# Patient Record
Sex: FEMALE | Race: WHITE | Employment: FULL TIME | URBAN - METROPOLITAN AREA
[De-identification: names, ages, dates, MRNs, and addresses within clinical notes are randomized per-mention and may not be internally consistent; named-entity substitution may affect disease eponyms.]

---

## 2018-01-08 LAB
ANTIBODY SCREEN, EXTERNAL: NEGATIVE
CHLAMYDIA, EXTERNAL: NEGATIVE
HBSAG, EXTERNAL: NEGATIVE
HCT, EXTERNAL: 39.8
HGB, EXTERNAL: 12.8
HIV, EXTERNAL: NEGATIVE
N. GONORRHEA, EXTERNAL: NEGATIVE
PLATELET CNT,   EXTERNAL: 293
RUBELLA, EXTERNAL: NORMAL
T. PALLIDUM, EXTERNAL: NORMAL
TYPE, ABO & RH, EXTERNAL: NORMAL

## 2018-05-15 LAB — GTT, 1 HR, GLUCOLA, EXTERNAL: 106

## 2018-07-24 LAB — GRBS, EXTERNAL: NEGATIVE

## 2018-08-14 ENCOUNTER — ANESTHESIA EVENT (OUTPATIENT)
Dept: LABOR AND DELIVERY | Age: 33
End: 2018-08-14
Payer: COMMERCIAL

## 2018-08-14 ENCOUNTER — ANESTHESIA (OUTPATIENT)
Dept: LABOR AND DELIVERY | Age: 33
End: 2018-08-14
Payer: COMMERCIAL

## 2018-08-14 ENCOUNTER — HOSPITAL ENCOUNTER (INPATIENT)
Age: 33
LOS: 2 days | Discharge: HOME | End: 2018-08-16
Attending: OBSTETRICS & GYNECOLOGY | Admitting: OBSTETRICS & GYNECOLOGY
Payer: COMMERCIAL

## 2018-08-14 DIAGNOSIS — R52 POSTPARTUM PAIN: Primary | ICD-10-CM

## 2018-08-14 PROBLEM — O34.219 PREVIOUS CESAREAN DELIVERY AFFECTING PREGNANCY: Status: ACTIVE | Noted: 2018-08-14

## 2018-08-14 LAB
ABO + RH BLD: NORMAL
BASOPHILS # BLD: 0.1 K/UL (ref 0–0.1)
BASOPHILS NFR BLD: 1 % (ref 0–1)
BLOOD GROUP ANTIBODIES SERPL: NORMAL
DIFFERENTIAL METHOD BLD: ABNORMAL
EOSINOPHIL # BLD: 0.1 K/UL (ref 0–0.4)
EOSINOPHIL NFR BLD: 1 % (ref 0–7)
ERYTHROCYTE [DISTWIDTH] IN BLOOD BY AUTOMATED COUNT: 13.2 % (ref 11.5–14.5)
HCT VFR BLD AUTO: 36.6 % (ref 35–47)
HGB BLD-MCNC: 11.9 G/DL (ref 11.5–16)
IMM GRANULOCYTES # BLD: 0.2 K/UL (ref 0–0.04)
IMM GRANULOCYTES NFR BLD AUTO: 2 % (ref 0–0.5)
LYMPHOCYTES # BLD: 1.7 K/UL (ref 0.8–3.5)
LYMPHOCYTES NFR BLD: 19 % (ref 12–49)
MCH RBC QN AUTO: 30.3 PG (ref 26–34)
MCHC RBC AUTO-ENTMCNC: 32.5 G/DL (ref 30–36.5)
MCV RBC AUTO: 93.1 FL (ref 80–99)
MONOCYTES # BLD: 0.8 K/UL (ref 0–1)
MONOCYTES NFR BLD: 9 % (ref 5–13)
NEUTS SEG # BLD: 6.1 K/UL (ref 1.8–8)
NEUTS SEG NFR BLD: 69 % (ref 32–75)
NRBC # BLD: 0 K/UL (ref 0–0.01)
NRBC BLD-RTO: 0 PER 100 WBC
PLATELET # BLD AUTO: 200 K/UL (ref 150–400)
PMV BLD AUTO: 10.1 FL (ref 8.9–12.9)
RBC # BLD AUTO: 3.93 M/UL (ref 3.8–5.2)
SPECIMEN EXP DATE BLD: NORMAL
WBC # BLD AUTO: 8.8 K/UL (ref 3.6–11)

## 2018-08-14 PROCEDURE — 76060000078 HC EPIDURAL ANESTHESIA: Performed by: OBSTETRICS & GYNECOLOGY

## 2018-08-14 PROCEDURE — 74011250636 HC RX REV CODE- 250/636: Performed by: OBSTETRICS & GYNECOLOGY

## 2018-08-14 PROCEDURE — 36415 COLL VENOUS BLD VENIPUNCTURE: CPT | Performed by: OBSTETRICS & GYNECOLOGY

## 2018-08-14 PROCEDURE — 77030018836 HC SOL IRR NACL ICUM -A

## 2018-08-14 PROCEDURE — 77030007866 HC KT SPN ANES BBMI -B: Performed by: NURSE ANESTHETIST, CERTIFIED REGISTERED

## 2018-08-14 PROCEDURE — C1765 ADHESION BARRIER: HCPCS

## 2018-08-14 PROCEDURE — 77030032490 HC SLV COMPR SCD KNE COVD -B

## 2018-08-14 PROCEDURE — 76010000391 HC C SECN FIRST 1 HR: Performed by: OBSTETRICS & GYNECOLOGY

## 2018-08-14 PROCEDURE — 76010000392 HC C SECN EA ADDL 0.5 HR: Performed by: OBSTETRICS & GYNECOLOGY

## 2018-08-14 PROCEDURE — 74011000250 HC RX REV CODE- 250

## 2018-08-14 PROCEDURE — 75410000003 HC RECOV DEL/VAG/CSECN EA 0.5 HR: Performed by: OBSTETRICS & GYNECOLOGY

## 2018-08-14 PROCEDURE — 77030039266 HC ADH SKN EXOFIN S2SG -A

## 2018-08-14 PROCEDURE — 85025 COMPLETE CBC W/AUTO DIFF WBC: CPT | Performed by: OBSTETRICS & GYNECOLOGY

## 2018-08-14 PROCEDURE — 74011250636 HC RX REV CODE- 250/636

## 2018-08-14 PROCEDURE — 65270000029 HC RM PRIVATE

## 2018-08-14 PROCEDURE — 74011000258 HC RX REV CODE- 258: Performed by: OBSTETRICS & GYNECOLOGY

## 2018-08-14 PROCEDURE — 77030034850

## 2018-08-14 PROCEDURE — 86900 BLOOD TYPING SEROLOGIC ABO: CPT | Performed by: OBSTETRICS & GYNECOLOGY

## 2018-08-14 RX ORDER — ONDANSETRON 2 MG/ML
INJECTION INTRAMUSCULAR; INTRAVENOUS AS NEEDED
Status: DISCONTINUED | OUTPATIENT
Start: 2018-08-14 | End: 2018-08-14 | Stop reason: HOSPADM

## 2018-08-14 RX ORDER — GENTAMICIN SULFATE 80 MG/100ML
INJECTION, SOLUTION INTRAVENOUS AS NEEDED
Status: DISCONTINUED | OUTPATIENT
Start: 2018-08-14 | End: 2018-08-14 | Stop reason: HOSPADM

## 2018-08-14 RX ORDER — FENTANYL CITRATE 50 UG/ML
INJECTION, SOLUTION INTRAMUSCULAR; INTRAVENOUS AS NEEDED
Status: DISCONTINUED | OUTPATIENT
Start: 2018-08-14 | End: 2018-08-14 | Stop reason: HOSPADM

## 2018-08-14 RX ORDER — SODIUM CHLORIDE 0.9 % (FLUSH) 0.9 %
5-10 SYRINGE (ML) INJECTION EVERY 8 HOURS
Status: DISCONTINUED | OUTPATIENT
Start: 2018-08-14 | End: 2018-08-16

## 2018-08-14 RX ORDER — MORPHINE SULFATE 0.5 MG/ML
INJECTION, SOLUTION EPIDURAL; INTRATHECAL; INTRAVENOUS AS NEEDED
Status: DISCONTINUED | OUTPATIENT
Start: 2018-08-14 | End: 2018-08-14 | Stop reason: HOSPADM

## 2018-08-14 RX ORDER — HYDROCODONE BITARTRATE AND ACETAMINOPHEN 5; 325 MG/1; MG/1
1 TABLET ORAL
Status: DISCONTINUED | OUTPATIENT
Start: 2018-08-14 | End: 2018-08-16 | Stop reason: HOSPADM

## 2018-08-14 RX ORDER — NALOXONE HYDROCHLORIDE 0.4 MG/ML
0.4 INJECTION, SOLUTION INTRAMUSCULAR; INTRAVENOUS; SUBCUTANEOUS AS NEEDED
Status: DISCONTINUED | OUTPATIENT
Start: 2018-08-14 | End: 2018-08-16 | Stop reason: HOSPADM

## 2018-08-14 RX ORDER — SIMETHICONE 80 MG
80 TABLET,CHEWABLE ORAL
Status: DISCONTINUED | OUTPATIENT
Start: 2018-08-14 | End: 2018-08-16 | Stop reason: HOSPADM

## 2018-08-14 RX ORDER — CLINDAMYCIN PHOSPHATE 900 MG/50ML
900 INJECTION, SOLUTION INTRAVENOUS ONCE
Status: COMPLETED | OUTPATIENT
Start: 2018-08-14 | End: 2018-08-14

## 2018-08-14 RX ORDER — OXYTOCIN 10 [USP'U]/ML
INJECTION, SOLUTION INTRAMUSCULAR; INTRAVENOUS AS NEEDED
Status: DISCONTINUED | OUTPATIENT
Start: 2018-08-14 | End: 2018-08-14 | Stop reason: HOSPADM

## 2018-08-14 RX ORDER — EPHEDRINE SULFATE 50 MG/ML
INJECTION, SOLUTION INTRAVENOUS AS NEEDED
Status: DISCONTINUED | OUTPATIENT
Start: 2018-08-14 | End: 2018-08-14 | Stop reason: HOSPADM

## 2018-08-14 RX ORDER — ZOLPIDEM TARTRATE 5 MG/1
5 TABLET ORAL
Status: DISCONTINUED | OUTPATIENT
Start: 2018-08-14 | End: 2018-08-16 | Stop reason: HOSPADM

## 2018-08-14 RX ORDER — SODIUM CHLORIDE 0.9 % (FLUSH) 0.9 %
5-10 SYRINGE (ML) INJECTION EVERY 8 HOURS
Status: DISCONTINUED | OUTPATIENT
Start: 2018-08-14 | End: 2018-08-14 | Stop reason: HOSPADM

## 2018-08-14 RX ORDER — BISACODYL 5 MG
5 TABLET, DELAYED RELEASE (ENTERIC COATED) ORAL DAILY PRN
Status: DISCONTINUED | OUTPATIENT
Start: 2018-08-14 | End: 2018-08-16 | Stop reason: HOSPADM

## 2018-08-14 RX ORDER — BUPIVACAINE HYDROCHLORIDE 7.5 MG/ML
INJECTION, SOLUTION EPIDURAL; RETROBULBAR AS NEEDED
Status: DISCONTINUED | OUTPATIENT
Start: 2018-08-14 | End: 2018-08-14 | Stop reason: HOSPADM

## 2018-08-14 RX ORDER — KETOROLAC TROMETHAMINE 30 MG/ML
30 INJECTION, SOLUTION INTRAMUSCULAR; INTRAVENOUS
Status: DISPENSED | OUTPATIENT
Start: 2018-08-14 | End: 2018-08-15

## 2018-08-14 RX ORDER — SODIUM CHLORIDE, SODIUM LACTATE, POTASSIUM CHLORIDE, CALCIUM CHLORIDE 600; 310; 30; 20 MG/100ML; MG/100ML; MG/100ML; MG/100ML
125 INJECTION, SOLUTION INTRAVENOUS CONTINUOUS
Status: DISCONTINUED | OUTPATIENT
Start: 2018-08-14 | End: 2018-08-16 | Stop reason: HOSPADM

## 2018-08-14 RX ORDER — SODIUM CHLORIDE 0.9 % (FLUSH) 0.9 %
5-10 SYRINGE (ML) INJECTION AS NEEDED
Status: DISCONTINUED | OUTPATIENT
Start: 2018-08-14 | End: 2018-08-16 | Stop reason: HOSPADM

## 2018-08-14 RX ORDER — SODIUM CHLORIDE 0.9 % (FLUSH) 0.9 %
5-10 SYRINGE (ML) INJECTION AS NEEDED
Status: DISCONTINUED | OUTPATIENT
Start: 2018-08-14 | End: 2018-08-14 | Stop reason: HOSPADM

## 2018-08-14 RX ORDER — OXYTOCIN/RINGER'S LACTATE 20/1000 ML
125-500 PLASTIC BAG, INJECTION (ML) INTRAVENOUS ONCE
Status: ACTIVE | OUTPATIENT
Start: 2018-08-14 | End: 2018-08-15

## 2018-08-14 RX ORDER — ONDANSETRON 2 MG/ML
4 INJECTION INTRAMUSCULAR; INTRAVENOUS
Status: DISCONTINUED | OUTPATIENT
Start: 2018-08-14 | End: 2018-08-16 | Stop reason: HOSPADM

## 2018-08-14 RX ORDER — SODIUM CHLORIDE, SODIUM LACTATE, POTASSIUM CHLORIDE, CALCIUM CHLORIDE 600; 310; 30; 20 MG/100ML; MG/100ML; MG/100ML; MG/100ML
1000 INJECTION, SOLUTION INTRAVENOUS CONTINUOUS
Status: DISCONTINUED | OUTPATIENT
Start: 2018-08-14 | End: 2018-08-14 | Stop reason: HOSPADM

## 2018-08-14 RX ADMIN — SODIUM CHLORIDE, SODIUM LACTATE, POTASSIUM CHLORIDE, AND CALCIUM CHLORIDE 1000 ML: 600; 310; 30; 20 INJECTION, SOLUTION INTRAVENOUS at 11:15

## 2018-08-14 RX ADMIN — BUPIVACAINE HYDROCHLORIDE 1.5 ML: 7.5 INJECTION, SOLUTION EPIDURAL; RETROBULBAR at 12:40

## 2018-08-14 RX ADMIN — OXYTOCIN 40 UNITS: 10 INJECTION, SOLUTION INTRAMUSCULAR; INTRAVENOUS at 13:07

## 2018-08-14 RX ADMIN — ONDANSETRON 4 MG: 2 INJECTION, SOLUTION INTRAMUSCULAR; INTRAVENOUS at 17:49

## 2018-08-14 RX ADMIN — EPHEDRINE SULFATE 5 MG: 50 INJECTION, SOLUTION INTRAVENOUS at 12:42

## 2018-08-14 RX ADMIN — KETOROLAC TROMETHAMINE 30 MG: 30 INJECTION, SOLUTION INTRAMUSCULAR at 15:32

## 2018-08-14 RX ADMIN — CLINDAMYCIN PHOSPHATE 900 MG: 900 INJECTION, SOLUTION INTRAVENOUS at 11:49

## 2018-08-14 RX ADMIN — GENTAMICIN SULFATE 300 MG: 40 INJECTION, SOLUTION INTRAMUSCULAR; INTRAVENOUS at 12:21

## 2018-08-14 RX ADMIN — ONDANSETRON 4 MG: 2 INJECTION INTRAMUSCULAR; INTRAVENOUS at 13:11

## 2018-08-14 RX ADMIN — SODIUM CHLORIDE, SODIUM LACTATE, POTASSIUM CHLORIDE, AND CALCIUM CHLORIDE 125 ML/HR: 600; 310; 30; 20 INJECTION, SOLUTION INTRAVENOUS at 15:20

## 2018-08-14 RX ADMIN — FENTANYL CITRATE 10 MCG: 50 INJECTION, SOLUTION INTRAMUSCULAR; INTRAVENOUS at 12:40

## 2018-08-14 RX ADMIN — GENTAMICIN SULFATE 300 MG: 80 INJECTION, SOLUTION INTRAVENOUS at 12:21

## 2018-08-14 RX ADMIN — EPHEDRINE SULFATE 5 MG: 50 INJECTION, SOLUTION INTRAVENOUS at 12:44

## 2018-08-14 RX ADMIN — MORPHINE SULFATE 200 MCG: 0.5 INJECTION, SOLUTION EPIDURAL; INTRATHECAL; INTRAVENOUS at 12:40

## 2018-08-14 RX ADMIN — KETOROLAC TROMETHAMINE 30 MG: 30 INJECTION, SOLUTION INTRAMUSCULAR at 21:33

## 2018-08-14 RX ADMIN — SODIUM CHLORIDE, SODIUM LACTATE, POTASSIUM CHLORIDE, AND CALCIUM CHLORIDE: 600; 310; 30; 20 INJECTION, SOLUTION INTRAVENOUS at 13:05

## 2018-08-14 RX ADMIN — SODIUM CHLORIDE, SODIUM LACTATE, POTASSIUM CHLORIDE, AND CALCIUM CHLORIDE 1000 ML: 600; 310; 30; 20 INJECTION, SOLUTION INTRAVENOUS at 12:22

## 2018-08-14 NOTE — H&P
History & Physical    Name: Meera Costa MRN: [id number]  SSN: [id number]    YOB: 1985  Age: 26 y.o. Sex: female      Subjective:     Estimated Date of Delivery: None noted. OB History      Para Term  AB Living    1         SAB TAB Ectopic Molar Multiple Live Births                   Ms. Brenda Rodriguez admitted with pregnancy at Unknown for  section due to previous  section. Prenatal course was normal. Please see prenatal records for details. No past medical history on file. No past surgical history on file. Social History     Occupational History    Not on file. Social History Main Topics    Smoking status: Not on file    Smokeless tobacco: Not on file    Alcohol use Not on file    Drug use: Not on file    Sexual activity: Not on file     No family history on file. Allergies not on file  Prior to Admission medications    Not on File        Review of Systems: A comprehensive review of systems was negative except for that written in the History of Present Illness. Objective:     Vitals: There were no vitals filed for this visit. Physical Exam:  Patient without distress. Abdomen: soft, nontender  Fundus: soft and non tender  Perineum: blood absent, amniotic fluid absent  Lower Extremities:  - Edema 1+  Estimated Fetal Weight: 8lbs 2oz   Membranes:  Intact  Fetal Heart Rate: waiting to go on the monitor    Prenatal Labs:   No results found for: RUBELLAEXT, GRBSEXT, HBSAGEXT, HIVEXT, RPREXT, GONNOEXT, CHLAMEXT     Impression/Plan:     Plan:  Admit for  section. Discussed the risks of surgery including the risks of bleeding, infection, deep vein thrombosis, and surgical injuries to internal organs including but not limited to the bowels, bladder, rectum, and female reproductive organs. The patient understands the risks; any and all questions were answered to the patient's satisfaction.     Signed By:  Blossom Collazo MD      2018

## 2018-08-14 NOTE — ANESTHESIA PROCEDURE NOTES
Spinal Block    Start time: 8/14/2018 12:36 PM  End time: 8/14/2018 12:40 PM  Performed by: Luz Elena Ugalde  Authorized by: Linda Echevarria     Pre-procedure:   Indications: at surgeon's request and primary anesthetic  Preanesthetic Checklist: patient identified, risks and benefits discussed, anesthesia consent and timeout performed      Spinal Block:   Patient Position:  Seated  Prep Region:  Lumbar  Prep: chlorhexidine      Location:  L3-4  Technique:  Single shot        Needle:   Needle Type:  Pencan  Needle Gauge:  25 G  Attempts:  1      Events: CSF confirmed, no blood with aspiration and no paresthesia        Assessment:  Insertion:  Uncomplicated  Patient tolerance:  Patient tolerated the procedure well with no immediate complications  Spinal easily placed x1 attempt  +CSF  Neg heme, neg paresthesia  T6 level assessed

## 2018-08-14 NOTE — DISCHARGE SUMMARY
Patient ID:  Delia Velazquez  [id number]  26 y.o.  1985    Admit Date: 2018    Discharge Date: 18     Admitting Physician: Cecy Sims MD    Attending Physician: Cecy Sims MD    Admission Diagnoses: Repeat  Previous  delivery affecting pregnancy    Procedures for this admission: Procedure(s):   SECTION    Discharge Diagnoses: Same as above with LFCS producing a viable infant. Information for the patient's :  Mode Melara [[id number]]   One Minute Apgar: 5 (Filed from Delivery Summary)  Five  Minute Apgar: 5 (Filed from Delivery Summary)        Discharge Disposition:  home    Discharge Condition:  stable    Additional Diagnoses: previous c section. Maternal Labs:   Lab Results   Component Value Date/Time    HBsAg, External negative 2018    HIV, External negative 2018    Rubella, External immune 2018    GrBStrep, External negative 2018       Cord Blood Results:   Information for the patient's :  Tai Fitzgerald Male [[id number]]   No results found for: PCTABR, ABORH, PCTDIG, BILI, ABORH, ABORHEXT          History of Present Illness:   OB History      Para Term  AB Living    3 1 1  1 1    SAB TAB Ectopic Molar Multiple Live Births    1    0 1        Admitted for  Section. Hospital Course:   Patient was admitted as above and delivered via LFCS . Please the chart for details. The postpartum course was unremarkable. She was deemed stable for discharge home on day 2.      Follow-up Care: 2 w        Signed:  Cecy Sims MD  2018  1:50 PM

## 2018-08-14 NOTE — PROGRESS NOTES
4:41 PM  TRANSFER - OUT REPORT:    Verbal report given to Villa Verma RN(name) on Ronald Vegas  being transferred to MIU(unit) for routine progression of care   By Cynthia Mccarthy RN via phone. Report consisted of patients Situation, Background, Assessment and   Recommendations(SBAR). Information from the following report(s) SBAR, Kardex, OR Summary, Procedure Summary, Intake/Output, MAR, Recent Results and Med Rec Status was reviewed with the receiving nurse. Lines:   Peripheral IV 18 Left Hand (Active)   Site Assessment Clean, dry, & intact 2018 11:25 AM   Phlebitis Assessment 0 2018 11:25 AM   Infiltration Assessment 0 2018 11:25 AM   Dressing Status Clean, dry, & intact 2018 11:25 AM   Dressing Type Tape;Transparent 2018 11:25 AM   Hub Color/Line Status Pink; Infusing;Patent 2018 11:25 AM   Action Taken Blood drawn 2018 11:25 AM   Alcohol Cap Used Yes 2018 11:25 AM        Opportunity for questions and clarification was provided.  ID bands confirmed with [employer], RN    Patient transported by PETR Aleman RN via stretcher and with baby via open crib.     Registered Nurse  Tech

## 2018-08-14 NOTE — ROUTINE PROCESS
Bedside and Verbal shift change report given to DARY Antony (oncoming nurse) by Kristofer Rangel RN (offgoing nurse). Report given with SBAR, Kardex, Intake/Output and MAR.

## 2018-08-14 NOTE — L&D DELIVERY NOTE
Delivery Summary    Patient: Koby Woods MRN: [id number]  SSN: [id number]    YOB: 1985  Age: 26 y.o. Sex: female        Labor Events:    Labor: No    Rupture Date: 2018    Rupture Time: 1:05 PM    Rupture Type: AROM    Amniotic Fluid Volume:      Amniotic Fluid Description:  Amniotic fluid Odor: Clear    None     Induction: None        Induction Date:        Induction Time:       Indications for Induction:       Augmentation: None    Augmentation Date:      Augmentation Time:      Indications for Augmentation:      Events:       Cervical Ripening:       None    Rupture Identifier: Rupture 1     Labor complications: None     Additional complications:         Delivery Events:  Estimated Blood Loss (ml): 800      Information for the patient's newbornNalini Martin Male [[id number]]     Delivery Summary - Baby    Delivery Date: 2018  Delivery Time: 1:05 PM  Delivery Type: , Low Transverse    Section Delivery:     Sex:  male     Gestational Age: 39w0d  Delivery Clinician:  Vincenzo Turner   Living?: Living  Delivery Location: OR           APGARS  One minute Five minutes Ten minutes   Skin color: 1   1        Heart rate: 2   2        Grimace: 2   2        Muscle tone: 2   2        Breathin   2        Totals: 9   9           Presentation: Vertex    Position:        Resuscitation Method:  Suctioning-bulb; Tactile Stimulation     Meconium Stained: None      Cord Information: 3 Vessels   Complications: None  Cord Blood Sent?:  Yes    Blood Gases Sent?:  No    Placenta:  Date/Time:   1:06 PM  Removal: Manual Removal      Appearance: Normal     [place] Measurements:  Birth Weight:        Birth Length:        Head Circumference:        Chest Circumference:       Abdominal Girth:       Other Providers:   CAS BROTHERS;DALTON GALLARDO;DON MOYA;GARDENIA MAGUIRE;SAVANNAH TRUJILLO;Tamela RICHARDSON CATHRYN L Obstetrician;Primary Nurse;Primary Magazine Nurse;Staff Nurse;Scrub Tech;Surgeon Assistant;Crna           Group Beta Strep:   Lab Results   Component Value Date/Time    GrANA MARIAtredarci, External negative 2018        Cord Blood Results:  Information for the patient's :  Eula Stephens, Male [[id number]]   No results found for: Juris Honer, PCTDIG, BILI, ABORHEXT, ABORH    Information for the patient's :  Eula Stephens, Male [[id number]]   No results found for: APH, APCO2, APO2, AHCO3, ABEC, ABDC, O2ST, SITE, [place], PHI, [place], PO2I, HCO3I, SO2I, IBD    Information for the patient's :  Eula Stephens, Male [[id number]]   No results found for: EPHV, PCO2V, PO2V, HCO3V, O2STV, EBDV

## 2018-08-14 NOTE — ROUTINE PROCESS
5:27 PM  Patient in room vomiting. Requesting anti-emetic. Called down to L&D to speak with provider Spoke with Dr. Jamal Segundo. Okay to order patient Zofran 4 mg IV every 4 hours as needed for nausea, as well as Phenergan 12.5 IV every 6 hours as needed. Orders placed. No further action needed at this time.

## 2018-08-14 NOTE — PROGRESS NOTES
TRANSFER - OUT REPORT:    Verbal report given to Cheyjo Slaughter (name) on Ronald Vegas  being transferred to MIU(unit) for routine post - op       Report consisted of patients Situation, Background, Assessment and   Recommendations(SBAR). Information from the following report(s) SBAR, Procedure Summary, Intake/Output, MAR, Recent Results and Med Rec Status was reviewed with the receiving nurse. Lines:   Peripheral IV 08/14/18 Left Hand (Active)   Site Assessment Clean, dry, & intact 8/14/2018 11:25 AM   Phlebitis Assessment 0 8/14/2018 11:25 AM   Infiltration Assessment 0 8/14/2018 11:25 AM   Dressing Status Clean, dry, & intact 8/14/2018 11:25 AM   Dressing Type Tape;Transparent 8/14/2018 11:25 AM   Hub Color/Line Status Pink; Infusing;Patent 8/14/2018 11:25 AM   Action Taken Blood drawn 8/14/2018 11:25 AM   Alcohol Cap Used Yes 8/14/2018 11:25 AM        Opportunity for questions and clarification was provided. Patient transported with:   Registered Nurse, infant in [facility], & patient belongings.

## 2018-08-14 NOTE — ANESTHESIA POSTPROCEDURE EVALUATION
Post-Anesthesia Evaluation and Assessment    Patient: Malvin Leiva MRN: [id number]  SSN: [id number]    YOB: 1985  Age: 26 y.o. Sex: female       Cardiovascular Function/Vital Signs  Visit Vitals    BP 99/56    Pulse 66    Temp 36.4 °C (97.6 °F)    Resp 16    Ht 5' 6\" (1.676 m)    Wt 87.1 kg (192 lb)    SpO2 99%    Breastfeeding Unknown    BMI 30.99 kg/m2       Patient is status post spinal anesthesia for Procedure(s):   SECTION. Nausea/Vomiting: None    Postoperative hydration reviewed and adequate. Pain:  Pain Scale 1: Numeric (0 - 10) (18 1358)  Pain Intensity 1: 0 (18 1358)   Managed    Neurological Status: At baseline    Mental Status and Level of Consciousness: Arousable    Pulmonary Status:   O2 Device: Room air (18 1358)   Adequate oxygenation and airway patent    Complications related to anesthesia: None    Post-anesthesia assessment completed.  No concerns    Signed By: Dayanna Montes MD     2018

## 2018-08-14 NOTE — IP AVS SNAPSHOT
[address] [facility], [place] 
 
 
 [address] [phone] Patient: Pau Allen MRN: [id number] :1985 About your hospitalization You were admitted on:  2018 [facility] the:  [facility] [address] You were discharged on:  2018 Why you were hospitalized Your primary diagnosis was:  Not on File Your diagnoses also included:  Previous  Delivery Affecting Pregnancy Follow-up Information Follow up With Details Comments Contact Info Macey Olson MD   [address] [phone] Discharge Orders None A check paris indicates which time of day the medication should be taken. My Medications START taking these medications Instructions Each Dose to Equal  
 Morning Noon Evening Bedtime HYDROcodone-acetaminophen 5-325 mg per tablet Commonly known as:  Aubree Melvin Your last dose was: Your next dose is: Take 1 Tab by mouth every six (6) hours as needed. Max Daily Amount: 4 Tabs. 1 Tab  
    
   
   
   
  
 ibuprofen 800 mg tablet Commonly known as:  MOTRIN Your last dose was: Your next dose is: Take 1 Tab by mouth every eight (8) hours as needed. 800 mg CONTINUE taking these medications Instructions Each Dose to Equal  
 Morning Noon Evening Bedtime PNV No12-Iron-FA-DSS-OM-3 29 mg iron-1 mg -50 mg Cpkd Your last dose was: Your next dose is: Take  by mouth. Where to Get Your Medications Information on where to get these meds will be given to you by the nurse or doctor. ! Ask your nurse or doctor about these medications HYDROcodone-acetaminophen 5-325 mg per tablet  
 ibuprofen 800 mg tablet Opioid Education Prescription Opioids: What You Need to Know: 
 
Prescription opioids can be used to help relieve moderate-to-severe pain and are often prescribed following a surgery or injury, or for certain health conditions. These medications can be an important part of treatment but also come with serious risks. Opioids are strong pain medicines. Examples include hydrocodone, oxycodone, fentanyl, and morphine. Heroin is an example of an illegal opioid. It is important to work with your health care provider to make sure you are getting the safest, most effective care. WHAT ARE THE RISKS AND SIDE EFFECTS OF OPIOID USE? Prescription opioids carry serious risks of addiction and overdose, especially with prolonged use. An opioid overdose, often marked by slow breathing, can cause sudden death. The use of prescription opioids can have a number of side effects as well, even when taken as directed. · Tolerance-meaning you might need to take more of a medication for the same pain relief · Physical dependence-meaning you have symptoms of withdrawal when the medication is stopped. Withdrawal symptoms can include nausea, sweating, chills, diarrhea, stomach cramps, and muscle aches. Withdrawal can last up to several weeks, depending on which drug you took and how long you took it. · Increased sensitivity to pain · Constipation · Nausea, vomiting, and dry mouth · Sleepiness and dizziness · Confusion · Depression · Low levels of testosterone that can result in lower sex drive, energy, and strength · Itching and sweating RISKS ARE GREATER WITH:      
· History of drug misuse, substance use disorder, or overdose · Mental health conditions (such as depression or anxiety) · Sleep apnea · Older age (72 years or older) · Pregnancy Avoid alcohol while taking prescription opioids. Also, unless specifically advised by your health care provider, medications to avoid include: · Benzodiazepines (such as Xanax or Valium) · Muscle relaxants (such as Soma or Flexeril) · Hypnotics (such as Ambien or Lunesta) · Other prescription opioids KNOW YOUR OPTIONS Talk to your health care provider about ways to manage your pain that don't involve prescription opioids. Some of these options may actually work better and have fewer risks and side effects. Options may include: 
· Pain relievers such as acetaminophen, ibuprofen, and naproxen · Some medications that are also used for depression or seizures · Physical therapy and exercise · Counseling to help patients learn how to cope better with triggers of pain and stress. · Application of heat or cold compress · Massage therapy · Relaxation techniques Be Informed Make sure you know the name of your medication, how much and how often to take it, and its potential risks & side effects. IF YOU ARE PRESCRIBED OPIOIDS FOR PAIN: 
· Never take opioids in greater amounts or more often than prescribed. Remember the goal is not to be pain-free but to manage your pain at a tolerable level. · Follow up with your primary care provider to: · Work together to create a plan on how to manage your pain. · Talk about ways to help manage your pain that don't involve prescription opioids. · Talk about any and all concerns and side effects. · Help prevent misuse and abuse. · Never sell or share prescription opioids · Help prevent misuse and abuse. · Store prescription opioids in a secure place and out of reach of others (this may include visitors, children, friends, and family). · Safely dispose of unused/unwanted prescription opioids: Find your community drug take-back program or your pharmacy mail-back program, or flush them down the toilet, following guidance from the Food and Drug Administration (www.fda.gov/Drugs/ResourcesForYou). · Visit www.cdc.gov/drugoverdose to learn about the risks of opioid abuse and overdose. · If you believe you may be struggling with addiction, tell your health care provider and ask for guidance or call Jackie Hou at 8-411-768-Thucy. Discharge Instructions Discharge Instructions for  Section Patient ID: Carol Liz [id number] 
26 y.o. 
1985 Take Home Medications Continue taking your prenatal vitamins if you are breastfeeding. Follow-up Appointment: 
Follow-up with [facility] in 2 weeks. Follow-up care is a key part of your treatment and safety. Be sure to make and go to all appointments, and call your doctor if you are having problems. Its also a good idea to know your test results and keep a list of the medicines you take. Activity Avoid lifting more than 10 lbs for 6 weeks after your delivery. Don't put anything in your vagina for 6 weeks (no intercourse, tampons, or douching). Limit climbing stairs to twice a day, and please hold a railing. Have someone else carry your baby up stairs initially, as you may be a bit unsteady. No driving for about 2 weeks or until your are easily able to turn your body and move your foot from the gas to the brake pedal without pain- you need to be able to move quickly enough to respond to traffic. You cannot drive while you are taking narcotics (prescription pain medications). You may shower but do not take a bath for 2 weeks. Be sure to dry your incision well after your shower. You may gradually work up to light exercise such as brisk walking over the next few weeks, but take it easy- you'll be tired and sore! You need to wait 4-6 weeks before starting vigorous exercise such as aerobics, running, weight-lifting, sit-ups, etc.- clear this with your doctor at your postpartum check-up. Although it's important to limit certain activities and avoid \"over-doing it\", walking is good for you and will actually speed the healing process. Walking increases the blood flow to your legs, decreasing the risk of blood clots, and also encourages the bowels to speed up, decreasing constipation, bloating, and gassiness. Don't stay in bed at home, get up and move around the house. You'll feel better more quickly. Diet You may eat a regular diet but you may want to avoid heavy, greasy or spicy foods and other foods that could increase constipation and gas. Wound care Your incision may have been closed with conventional (metal) staples, absorbable (dissolving) staples, or absorbable sutures. If you still have staples in your incision when you leave the hospital, call your doctor's office as instructed to schedule an appointment to have them removed (usually in about a week). Otherwise, there may be some small tapes over your incision called Steri-strips. Please remove these in about a week. There also may be Dermabond glue over your incision which looks like a clear, shiny coating (as if it were painted with clear fingernail polish). This will gradually peel off over the following weeks, do not remove it. It is normal to have a small amount of clear or reddish drainage from your incision. It's best to leave it open to the air, but if there is drainage, you may cover the incision lightly with gauze, preferably without tape. Keep the incision as dry as possible. You may even consider drying the incision with a cool hair dryer after you shower. Numbness of the skin at or around your incision is normal and the feeling usually returns gradually. Call your doctor if you have a lot of drainage from your incision, an unpleasant odor, red streaks, an increase in pain, or the incision appears to open up. Pain Management You were probably given a prescription for pain medication, similar to the one you've been taking while in the hospital.  In addition to this, you can take over-the-counter pain medicines like Advil or Motrin (ibuprofen). You can take both medications together, alternating doses every few hours. You will get the most relief if you take the maximum dose:  Motrin or Advil (generic ibuprofen) 800 mg every 8 hours (4 tablets or capsules every 8 hours). The prescription you were given probably contains a narcotic mixed with Tylenol, therefore, you should not take any extra Tylenol or acetominophen, or you could be getting more than the safe amount. If you feel you don't need the prescription pain medication, you may take Tylenol instead, along with ibuprofen. The maximum dose is Tylenol or acetominophen 1000 mg every 6 hours (equivalent to 2 Extra Strength Tylenols every 6 hours). After a few days, you should reduce the amount of prescription pain medication you are taking. At that point, try to use ibuprofen as the main medication, and take the prescription medication if needed for more severe pain not relieved by the ibuprofen. Your goal should be to take only the minimum necessary amounts of the prescription medication (narcotic), as these pain medicines can be addictive and will worsen or cause constipation. Most patients will find that within a few days, their pain is adequately controlled using only over-the-counter medications and minimal doses of prescription pain medicine. A heating pad can also be very helpful for cramping or back pain. Sitz baths are helpful for pain associated with hemorrhoids. You can use either a sitz bath basin or a bathtub filled with 2-3\" inches of plain warm water. Soak for 10 minutes 3 times a day. Over-the-counter hemorrhoid wipes and ointments are fine to use as well. Constipation You may find that bowel movements are irregular after delivery and that you have a tendency to be constipated, especially after surgery. A stool softener such as Colace (docusate) can safely be taken daily if needed.   If you become constipated you can use a laxative such as Dulcolax, Miralax or Milk of Magnesia. Don't wait until constipation is severe- take something sooner rather than later and you will feel much better! Suppositories such as Dulcolax or Fleet's Enemas are options too. Your Recovery: What to Expect at [facility] Delivering a baby is hard work and you probably aren't getting much sleep, so you will certainly be tired, especially after having a  section. Try to rest when you can and don't worry about doing housework or other tasks which can wait. The soreness in your incision will improve significantly over the first 2 weeks or so, but it may take 6-8 weeks before you are completely recovered. You are likely to have some back pain or general body aches or muscle soreness. This should improve with acetominophen or ibuprofen. If your legs were swollen during your pregnancy or as a result of IV fluids given during your hospital stay, this should go away in a few days to a week. Most women experience some form of the \"Baby Blues\" after having a baby. This means that you may feel emotional, tearful, frustrated, anxious, sad, and irritable some of the time. This is normal if it's not severe and should go away after about 2 weeks. Getting as much rest as you can will help. Accept assistance from friends and family members so that you can take breaks from caring for the baby. Call your doctor if your symptoms seem severe, last more than 2 weeks, or seem to be getting worse instead of better. Get help immediately if you have thoughts of wanting to hurt yourself or others! When should you call for help? Call 911 anytime you think you may need emergency care. For example, call if: You pass out (lose consciousness). You have sudden chest pain and shortness of breath, or you cough up blood. You have severe pain in your belly.  
 
Call your doctor now or seek immediate medical care if: 
You have heavy vaginal bleeding that soaks one or more pads in an hour, or you have large clots (golf ball size or larger). Your have foul-smelling discharge from your vagina or incision. You are sick to your stomach or cannot keep fluids down. You have pain that does not get better after you take pain medicine. You have signs of infection, such as: Increased pain in your abdomen or vaginal area. Red streaks, warmth, or tenderness of your breasts or the area around your incision. A fever of 101 or greater (taken by mouth). You have signs of a blood clot, such as: 
Pain in your calf, back of knee, thigh, or groin. Redness and swelling in your leg or groin. You have trouble passing urine or stool, especially if you have pain or swelling in your lower belly; or if you are unable to have a bowel movement after taking a laxative. You have a fast or pounding heartbeat. Walk-in Appointment Scheduler Announcement We are excited to announce that we are making your provider's discharge notes available to you in Walk-in Appointment Scheduler. You will see these notes when they are completed and signed by the physician that discharged you from your recent hospital stay. If you have any questions or concerns about any information you see in Walk-in Appointment Scheduler, please call the Health Information Department where you were seen or reach out to your Primary Care Provider for more information about your plan of care. Introducing [facility]! Eric Crowder introduces Walk-in Appointment Scheduler patient portal. Now you can access parts of your medical record, email your doctor's office, and request medication refills online. 1. In your internet browser, go to [email or web address] 2. Click on the First Time User? Click Here link in the Sign In box. You will see the New Member Sign Up page. 3. Enter your Walk-in Appointment Scheduler Access Code exactly as it appears below. You will not need to use this code after youve completed the sign-up process. If you do not sign up before the expiration date, you must request a new code. · [employer] Access Code: [email or web address] Expires: 11/14/2018  7:25 AM 
 
4. Enter the last four digits of your Social Security Number (xxxx) and Date of Birth (mm/dd/yyyy) as indicated and click Submit. You will be taken to the next sign-up page. 5. Create a [employer] ID. This will be your [employer] login ID and cannot be changed, so think of one that is secure and easy to remember. 6. Create a [employer] password. You can change your password at any time. 7. Enter your Password Reset Question and Answer. This can be used at a later time if you forget your password. 8. Enter your e-mail address. You will receive e-mail notification when new information is available in [address] 9. Click Sign Up. You can now view and download portions of your medical record. 10. Click the Download Summary menu link to download a portable copy of your medical information. If you have questions, please visit the Frequently Asked Questions section of the [employer] website. Remember, [employer] is NOT to be used for urgent needs. For medical emergencies, dial 911. Now available from your iPhone and Android! Introducing Ralph Webster As a [employer] patient, I wanted to make you aware of our electronic visit tool called Ralph Stevensonivanjin. [employer] 24/7 allows you to connect within minutes with a medical provider 24 hours a day, seven days a week via a mobile device or tablet or logging into a secure website from your computer. You can access Ralph Webster from anywhere in the [place]. A virtual visit might be right for you when you have a simple condition and feel like you just dont want to get out of bed, or cant get away from work for an appointment, when your regular [employer] provider is not available (evenings, weekends or holidays), or when youre out of town and need minor care.   Electronic visits cost only $49 and if the [facility] 24/7 provider determines a prescription is needed to treat your condition, one can be electronically transmitted to a nearby pharmacy*. Please take a moment to enroll today if you have not already done so. The enrollment process is free and takes just a few minutes. To enroll, please download the [employer] 24/7 paul to your tablet or phone, or visit [email or web address] to enroll on your computer. And, as an [address] [place] [address] patient with a [employer] account, the results of your visits will be scanned into your electronic medical record and your primary care provider will be able to view the scanned results. We urge you to continue to see your regular [employer] provider for your ongoing medical care. And while your primary care provider may not be the one available when you seek a [employer] virtual visit, the peace of mind you get from getting a real diagnosis real time can be priceless. For more information on [employer], view our Frequently Asked Questions (FAQs) at [email or web address]. Sincerely, 
 
Bharath Bailey MD 
Chief Medical Officer [facility] Aditi Valencia *:  certain medications cannot be prescribed via [employer] Providers Seen During Your Hospitalization Provider Specialty Primary office phone Alisia Garcia MD Obstetrics & Gynecology [phone] Your Primary Care Physician (PCP) Primary Care Physician Office Phone Office Fax Mojgan Duran [phone] You are allergic to the following Allergen Reactions Penicillins Rash Swelling Recent Documentation Height Weight Breastfeeding? BMI OB Status Smoking Status 1.676 m 87.1 kg Unknown 30.99 kg/m2 Recent pregnancy Former Smoker Emergency Contacts Name Discharge Info Relation Home Work Mobile [address] [facility] CAREGIVER [3] Spouse [3] [phone] Patient Belongings The following personal items are in your possession at time of discharge: 
  Dental Appliances: None         Home Medications: None      Clothing: At bedside, With patient    Other Valuables: Cell Phone, Waqar Please provide this summary of care documentation to your next provider. Signatures-by signing, you are acknowledging that this After Visit Summary has been reviewed with you and you have received a copy. Patient Signature:  ____________________________________________________________ Date:  ____________________________________________________________  
  
[facility] Provider Signature:  ____________________________________________________________ Date:  ____________________________________________________________

## 2018-08-14 NOTE — LACTATION NOTE
This note was copied from a baby's chart. Baby latched skin to skin with mother when  [facility] entered the room, rhythmic suckling noted. Discussed with mother her plan for feeding. Reviewed the benefits of exclusive breast milk feeding during the hospital stay. Informed her of the risks of using formula to supplement in the first few days of life as well as the benefits of successful breast milk feeding; referred her to the Breastfeeding booklet about this information. She acknowledges understanding of information reviewed and states that it is her plan to breastfeed her infant. Will support her choice and offer additional information as needed. Reviewed breastfeeding basics:  How milk is made and normal  breastfeeding behaviors discussed. Supply and demand,  stomach size, early feeding cues, skin to skin bonding with comfortable positioning and baby led latch-on reviewed. How to identify signs of successful breastfeeding sessions reviewed; education on assymetrical latch, signs of effective latching vs shallow, in-effective latching, normal  feeding frequency and duration and expected infant output discussed. Normal course of breastfeeding discussed including the AAP's recommendation that children receive exclusive breast milk feedings for the first six months of life with breast milk feedings to continue through the first year of life and/or beyond as complimentary table foods are added. Breastfeeding Booklet and Warm line information provided with discussion. Discussed typical  weight loss and the importance of pediatrician appointment within 24-48 hours of discharge, at 2 weeks of life and normalcy of requesting pediatric weight checks as needed in between visits. Pt will successfully establish breastfeeding by feeding in response to early feeding cues   or wake every 3h, will obtain deep latch, and will keep log of feedings/output.   Taught to BF at hunger cues and or q 2-3 hrs and to offer 10-20 drops of hand expressed colostrum at any non-feeds. Breast Assessment  Left Breast: Medium  Left Nipple: Everted, Intact  Right Breast: Medium  Right Nipple: Everted, Intact  Breast- Feeding Assessment  Attends Breast-Feeding Classes: No  Breast-Feeding Experience: Yes (3-4 months with first)  Breast Trauma/Surgery: No  Type/Quality: Good (first feeding)  Lactation Consultant Visits  Breast-Feedings: Good   Mother/Infant Observation  Mother Observation: Alignment, Breast comfortable, Close hold  Infant Observation: Latches nipple and aereolae, Lips flanged, lower, Lips flanged, upper, Opens mouth  LATCH Documentation  Latch: Grasps breast, tongue down, lips flanged, rhythmic sucking  Audible Swallowing: A few with stimulation  Type of Nipple: Everted (after stimulation)  Comfort (Breast/Nipple): Soft/non-tender  Hold (Positioning): Full assist, teach one side, mother does other, staff holds  LATCH Score: 8  Reviewed breastfeeding basics:  How milk is made and normal  breastfeeding behaviors discussed. Supply and demand,  stomach size, early feeding cues, skin to skin bonding with comfortable positioning and baby led latch-on reviewed. How to identify signs of successful breastfeeding sessions reviewed; education on assymetrical latch, signs of effective latching vs shallow, in-effective latching, normal  feeding frequency and duration and expected infant output discussed. Normal course of breastfeeding discussed including the AAP's recommendation that children receive exclusive breast milk feedings for the first six months of life with breast milk feedings to continue through the first year of life and/or beyond as complimentary table foods are added. Breastfeeding Booklet and Warm line information provided with discussion.   Discussed typical  weight loss and the importance of pediatrician appointment within 24-48 hours of discharge, at 2 weeks of life and normalcy of requesting pediatric weight checks as needed in between visits.

## 2018-08-14 NOTE — IP AVS SNAPSHOT
[address] 
[phone] Patient: Mike Thomas MRN: [id number] :1985 A check paris indicates which time of day the medication should be taken. My Medications START taking these medications Instructions Each Dose to Equal  
 Morning Noon Evening Bedtime HYDROcodone-acetaminophen 5-325 mg per tablet Commonly known as:  Tom Dolphin Your last dose was: Your next dose is: Take 1 Tab by mouth every six (6) hours as needed. Max Daily Amount: 4 Tabs. 1 Tab  
    
   
   
   
  
 ibuprofen 800 mg tablet Commonly known as:  MOTRIN Your last dose was: Your next dose is: Take 1 Tab by mouth every eight (8) hours as needed. 800 mg CONTINUE taking these medications Instructions Each Dose to Equal  
 Morning Noon Evening Bedtime PNV No12-Iron-FA-DSS-OM-3 29 mg iron-1 mg -50 mg Cpkd Your last dose was: Your next dose is: Take  by mouth. Where to Get Your Medications Information on where to get these meds will be given to you by the nurse or doctor. ! Ask your nurse or doctor about these medications HYDROcodone-acetaminophen 5-325 mg per tablet  
 ibuprofen 800 mg tablet

## 2018-08-14 NOTE — ANESTHESIA PREPROCEDURE EVALUATION
Anesthetic History   No history of anesthetic complications            Review of Systems / Medical History  Patient summary reviewed, nursing notes reviewed and pertinent labs reviewed    Pulmonary          Smoker (quit 2015)         Neuro/Psych   Within defined limits           Cardiovascular  Within defined limits                Exercise tolerance: >4 METS     GI/Hepatic/Renal  Within defined limits              Endo/Other  Within defined limits           Other Findings            Physical Exam    Airway  Mallampati: II  TM Distance: > 6 cm  Neck ROM: normal range of motion   Mouth opening: Normal     Cardiovascular  Regular rate and rhythm,  S1 and S2 normal,  no murmur, click, rub, or gallop             Dental  No notable dental hx       Pulmonary  Breath sounds clear to auscultation               Abdominal  GI exam deferred       Other Findings            Anesthetic Plan    ASA: 2  Anesthesia type: spinal          Induction: Intravenous  Anesthetic plan and risks discussed with: Patient

## 2018-08-15 LAB
BASOPHILS # BLD: 0.1 K/UL (ref 0–0.1)
BASOPHILS NFR BLD: 0 % (ref 0–1)
DIFFERENTIAL METHOD BLD: ABNORMAL
EOSINOPHIL # BLD: 0.1 K/UL (ref 0–0.4)
EOSINOPHIL NFR BLD: 1 % (ref 0–7)
ERYTHROCYTE [DISTWIDTH] IN BLOOD BY AUTOMATED COUNT: 13 % (ref 11.5–14.5)
HCT VFR BLD AUTO: 31.7 % (ref 35–47)
HGB BLD-MCNC: 10.8 G/DL (ref 11.5–16)
IMM GRANULOCYTES # BLD: 0.1 K/UL (ref 0–0.04)
IMM GRANULOCYTES NFR BLD AUTO: 1 % (ref 0–0.5)
LYMPHOCYTES # BLD: 2.1 K/UL (ref 0.8–3.5)
LYMPHOCYTES NFR BLD: 17 % (ref 12–49)
MCH RBC QN AUTO: 31.4 PG (ref 26–34)
MCHC RBC AUTO-ENTMCNC: 34.1 G/DL (ref 30–36.5)
MCV RBC AUTO: 92.2 FL (ref 80–99)
MONOCYTES # BLD: 0.9 K/UL (ref 0–1)
MONOCYTES NFR BLD: 8 % (ref 5–13)
NEUTS SEG # BLD: 8.8 K/UL (ref 1.8–8)
NEUTS SEG NFR BLD: 73 % (ref 32–75)
NRBC # BLD: 0 K/UL (ref 0–0.01)
NRBC BLD-RTO: 0 PER 100 WBC
PLATELET # BLD AUTO: 174 K/UL (ref 150–400)
PMV BLD AUTO: 9.8 FL (ref 8.9–12.9)
RBC # BLD AUTO: 3.44 M/UL (ref 3.8–5.2)
WBC # BLD AUTO: 12 K/UL (ref 3.6–11)

## 2018-08-15 PROCEDURE — 85025 COMPLETE CBC W/AUTO DIFF WBC: CPT | Performed by: OBSTETRICS & GYNECOLOGY

## 2018-08-15 PROCEDURE — 74011250637 HC RX REV CODE- 250/637: Performed by: OBSTETRICS & GYNECOLOGY

## 2018-08-15 PROCEDURE — 65270000029 HC RM PRIVATE

## 2018-08-15 PROCEDURE — 36415 COLL VENOUS BLD VENIPUNCTURE: CPT | Performed by: OBSTETRICS & GYNECOLOGY

## 2018-08-15 RX ORDER — IBUPROFEN 800 MG/1
800 TABLET ORAL
Status: DISCONTINUED | OUTPATIENT
Start: 2018-08-15 | End: 2018-08-16 | Stop reason: HOSPADM

## 2018-08-15 RX ADMIN — IBUPROFEN 800 MG: 800 TABLET ORAL at 23:15

## 2018-08-15 RX ADMIN — HYDROCODONE BITARTRATE AND ACETAMINOPHEN 1 TABLET: 5; 325 TABLET ORAL at 14:46

## 2018-08-15 RX ADMIN — HYDROCODONE BITARTRATE AND ACETAMINOPHEN 1 TABLET: 5; 325 TABLET ORAL at 09:29

## 2018-08-15 RX ADMIN — IBUPROFEN 800 MG: 800 TABLET ORAL at 14:46

## 2018-08-15 RX ADMIN — HYDROCODONE BITARTRATE AND ACETAMINOPHEN 1 TABLET: 5; 325 TABLET ORAL at 04:58

## 2018-08-15 NOTE — OP NOTES
[facility] 77  OPERATIVE REPORT    Claudia Soto  MR#: [id number]  : 1985  ACCOUNT #: [de-identified]   DATE OF SERVICE: 2018    PREOPERATIVE DIAGNOSES:  Intrauterine pregnancy at term, prior  section. POSTOPERATIVE DIAGNOSES:  Intrauterine pregnancy at term, prior  section. PROCEDURE PERFORMED:  Repeat low transverse  section. SURGEON:  Rolando Rodrigues MD    ASSISTANT:  Derick Hillman MD    ANESTHESIA:  Spinal.  Dr. Mik Levi is the MD and Ms. Gilberto Waddell is the CRNA. Patient was advised of risks, benefits and alternatives of the procedure. Risks including those of bleeding, infection, injury to surrounding structures and questions were answered and the procedure was done. Timeout was done prior to the start of the procedure. She received IV antibiotics preoperatively and SCDs were placed on her extremities and indwelling Snyder catheter as well. After she was prepped and draped, skin was tested prior to making a Pfannenstiel skin incision through the prior incision. Fascia was incised transversely and extended with scissors and  from the underlying muscle with sharp dissection. The fascia was then picked up with Kocher clamps and  from the underlying muscle with sharp dissection superiorly and inferiorly. An opening was made in the peritoneal cavity by picking it up with Nani clamps and cutting with Metzenbaum scissors. The peritoneal opening was made. Bladder blade was inserted at the lower end of the peritoneal incision. Uterovesical fold of peritoneum was picked up and cut open using Metzenbaum scissors. A low transverse incision was made with the knife. Amniotomy was done, clear fluid drained. Baby was delivered with fundal pressure. Cord was double clamped and cut after a minute. The baby's sex is male. Apgars 9 at 1 minute and 9 at 5 minutes respectively. IV Pitocin was running. Placenta was delivered spontaneously. Uterus was exteriorized. Cavity was cleared of products of conception. Edges of the uterine incision were held with [place] forceps and the uterus was cleaned out and suctioned out and it was closed with a continuous locking suture of 0 chromic catgut and another figure-of-eight suture at the right end as well to obtain hemostasis. Tubes and ovaries were normal.  Uterus was placed back into the peritoneal cavity. Pericolic gutters were cleared of amniotic fluid, blood and debris. Incision was inspected and found to be hemostatic. Seprafilm was placed on it and then edges of the peritoneum were held with Nani clamps and closed with a continuous nonlocking suture of 0 Vicryl. The fascia was then inspected subfascially and suprafascially and hemostasis achieved with electrocautery and then it was closed with one Stratafix from end-to-end. Subcutaneous fatty tissue was approximated using 3-0 Vicryl continuous nonlocking and skin approximated using subcuticular 3-0 Vicryl. Exofin was placed on the skin as glue closure and then after dried, pressure dressing was placed. ESTIMATED BLOOD LOSS:  About 800 mL. Sponge count and instrument counts were correct. The patient was taken to recovery room in stable condition at the end of the operative procedure.       MD MAXINE Lopez / TATIANA  D: 08/14/2018 13:55     T: 08/14/2018 20:57  JOB #: [id number]

## 2018-08-15 NOTE — PROGRESS NOTES
PostPartum Note    Linda Falk  [id number]  1985  26 y.o.    S:  Ms. Linda Falk is a 26 y.o.  POD #1 s/p LTCS @ 39w0d. Doing well. She had a baby boy. Her lochia is like a period. She describes her pain as mild and is well controlled with PO medications. Tolerating PO intake. O:   Visit Vitals    /63 (BP 1 Location: Right arm, BP Patient Position: At rest)    Pulse 67    Temp 98.4 °F (36.9 °C)    Resp 16    Ht 5' 6\" (1.676 m)    Wt 87.1 kg (192 lb)    SpO2 100%    Breastfeeding Unknown    BMI 30.99 kg/m2       Lab Results   Component Value Date/Time    WBC 12.0 (H) 08/15/2018 03:32 AM    HGB 10.8 (L) 08/15/2018 03:32 AM    HCT 31.7 (L) 08/15/2018 03:32 AM    PLATELET 059  03:32 AM    MCV 92.2 08/15/2018 03:32 AM    Hgb, External 12.8 2018    Hct, External 39.8 2018    Platelet cnt., External 293 2018       Gen - No acute distress  Abdomen - Fundus firm, below the umbilicus; incision c/d/i   Ext - Warm, well perfused. Nontender    A/P:  POD #1 s/p LTCS @ 39w0d doing well. 1.  Routine PP instructions/ care discussed  2. Blood type - Rh +  3. Rubella imm  4. Circumcision if desired    5. Discharge POD#3    6. F/U 4-6 weeks for PP check.       Mary Islas MD  [facility]

## 2018-08-15 NOTE — PROGRESS NOTES
Bedside and Verbal shift change report given to Padmini Mckinney (oncoming nurse) by Mely Abarca. Abiel Ng (offgoing nurse). Report given with SBAR, Kardex, Intake/Output and MAR.

## 2018-08-15 NOTE — ROUTINE PROCESS
Bedside shift change report given to Nany Lopez RN (oncoming nurse) by PETR Warren RN (offgoing nurse). Report included the following information SBAR, Intake/Output, MAR and Recent Results.

## 2018-08-15 NOTE — LACTATION NOTE
This note was copied from a baby's chart. Assisted mother with waking baby, positioning, and latch. Mother c/o discomfort with latch. Assisted mother with biological and sidelying. Baby latched deeply in sidelying position. Mother continues to c/o discomfort with latching, nipple noted to be creased on release. Worked with mother on presenting breast to baby and deepening the latch. Reviewed breastfeeding techniques and positions with mother until found a position she was most comfortable with. Reminded mother of early feeding cues and that breast fed infants should be fed on demand without time restriction on the first breast until the infant seems satisfied. Then the second breast is offered. Advised mother to awaken  to feed if three hours have passed since baby last ate. Will continue to monitor mother's progress with breastfeeding and offer assistance at any time. Pt will successfully establish breastfeeding by feeding in response to early feeding cues   or wake every 3h, will obtain deep latch, and will keep log of feedings/output. Taught to BF at hunger cues and or q 2-3 hrs and to offer 10-20 drops of hand expressed colostrum at any non-feeds.       Breast Assessment  Left Breast: Medium  Left Nipple: Everted, Intact, Tender  Right Breast: Medium  Right Nipple: Everted, Intact, Tender  Breast- Feeding Assessment  Attends Breast-Feeding Classes: No  Breast-Feeding Experience: Yes (3-4 months with first)  Breast Trauma/Surgery: No  Type/Quality: Good (first feeding)  Lactation Consultant Visits  Breast-Feedings: Good   Mother/Infant Observation  Mother Observation: Holds breast, Recognizes feeding cues  Infant Observation: Rhythmic suck, Relaxed after feeding, Opens mouth, Lips flanged, upper, Lips flanged, lower, Latches nipple and aereolae, Feeding cues, Audible swallows  LATCH Documentation  Latch: Grasps breast, tongue down, lips flanged, rhythmic sucking  Audible Swallowing: Spontaneous and intermittent (24 hours old)  Type of Nipple: Everted (after stimulation)  Comfort (Breast/Nipple): Soft/non-tender  Hold (Positioning): No assist from staff, mother able to position/hold infant  LATCH Score: 10

## 2018-08-16 VITALS
OXYGEN SATURATION: 100 % | WEIGHT: 192 LBS | HEIGHT: 66 IN | DIASTOLIC BLOOD PRESSURE: 55 MMHG | HEART RATE: 60 BPM | TEMPERATURE: 97.9 F | SYSTOLIC BLOOD PRESSURE: 99 MMHG | RESPIRATION RATE: 16 BRPM | BODY MASS INDEX: 30.86 KG/M2

## 2018-08-16 PROCEDURE — 74011250637 HC RX REV CODE- 250/637: Performed by: OBSTETRICS & GYNECOLOGY

## 2018-08-16 RX ORDER — HYDROCODONE BITARTRATE AND ACETAMINOPHEN 5; 325 MG/1; MG/1
1 TABLET ORAL
Qty: 24 TAB | Refills: 0 | Status: SHIPPED | OUTPATIENT
Start: 2018-08-16

## 2018-08-16 RX ORDER — IBUPROFEN 800 MG/1
800 TABLET ORAL
Qty: 40 TAB | Refills: 1 | Status: SHIPPED | OUTPATIENT
Start: 2018-08-16

## 2018-08-16 RX ADMIN — IBUPROFEN 800 MG: 800 TABLET ORAL at 08:09

## 2018-08-16 RX ADMIN — HYDROCODONE BITARTRATE AND ACETAMINOPHEN 1 TABLET: 5; 325 TABLET ORAL at 05:59

## 2018-08-16 NOTE — PROGRESS NOTES
Bedside shift change report given to Elio Craig RN (oncoming nurse) by Anca Tiwari (offgoing nurse). Report included the following information SBAR and MAR.

## 2018-08-16 NOTE — LACTATION NOTE
Problem: Lactation Care Plan  Goal: *Infant latching appropriately  Outcome: Resolved/Met Date Met: 08/16/18  Mom comfortable and relaxed with breast feeding, but states when baby ltches it hurts. Discussed checking for flanged lips and a deep latch.        Goal: *Weight loss less than 10% of birth weight  Outcome: Resolved/Met Date Met: 08/16/18  Encouraged mom to attempt feeding with baby led feeding cues. Just as sucking on fingers, rooting, mouthing. Looking for 8-12 feedings in 24 hours. Don't limit baby at breast, allow baby to come of breast on it's own. Baby may want to feed  often and may increase number of feedings on second day of life. Skin to skin encouraged.      If baby doesn't nurse,  Mom should  hand express  10-20 drops of colostrum and drip into baby's mouth, or give to baby by finger feeding, cup feeding, or spoon feeding at least every 2-3 hours. Mom may  Pump and give infant any expressed milk. If not pumping any milk, mom should contact pediatrician for possible need for supplementation.         Problem: Patient Education: Go to Patient Education Activity  Goal: Patient/Family Education  Outcome: Resolved/Met Date Met: 08/16/18  Discussed eating a healthy diet. Instructed mother to eat a variety of foods in order to get a well balanced diet. She should consume an extra 300-500 calories per day (more than her non-pregnant requirement.) These extra calories will help provide energy needed for optimal breast milk production. Mother also encouraged to \"drink to thirst\" and it is recommended that she drink fluids such as water and fruit/vegetable juice. Nutritious snacks should be available so that she can eat throughout the day to help satisfy her hunger and maintain a good milk supply. Continue taking your prenatal vitamins as long as you breast feed.      Chart shows numerous feedings, void, stool WNL.   Discussed Importance of monitoring outputs and feedings on first week of Breastfeeding. Discussed ways to tell if baby getting enough, ie  Voids and stools, by day 7, baby should have at least  4-6 wet diapers a day, change in color of stool to a seedy yellow, and return to birth wt within 2 weeks with a steady increase after that. .  Follow up with pediatrician visit for weight check in 1-2 days reviewed. Discussed Breast feeding support groups and encouraged to call warm line number, [phone] or [facility] at [phone]  for any breast feeding questions/problems that arise.      Encouraged mom to attempt feeding with baby led feeding cues. Just as sucking on fingers, rooting, mouthing. Looking for 8-12 feedings in 24 hours. Don't limit baby at breast, allow baby to come of breast on it's own. Baby may want to feed  often and may increase number of feedings on second day of life. Skin to skin encouraged. In 4-6 weeks, baby may go though a growth spurt and increase feedings for several days to increase your milk supply.       If baby doesn't nurse,  Mom should Pump and give infant any expressed milk. If not pumping any milk, mom should contact pediatrician for possible need for supplementation.        Engorgement Care Guidelines:  Anticipatory guidance shared. Reviewed how milk is made and normal phases of milk production. Taught care of engorged breasts  and how to help decrease engorgement.   If mom should experience engorged breast, frequent breastfeeding encouraged, cool packs around breast and motrin or Ibuprofen as ordered by your Doctor.        Call your doctor, midwife and/or lactation consultant if:   · Baby is having no wet or dirty diapers   · Baby has dark colored urine after day 3  (should be pale yellow to clear)   · Baby has dark colored stools after day 4  (should be mustard yellow, with no meconium)   · Baby has fewer wet/soiled diapers or nurses less   frequently than the goals listed here   · Mom has symptoms of mastitis   (sore breast with fever, chills, flu-like aching)

## 2018-08-16 NOTE — PROGRESS NOTES
Post-Operative  Day 2    Berta Calixto         Information for the patient's :  Lolly Mcburney, Male [[id number]]   , Low Transverse   Patient doing well without unusual complaints. Patient notes good relief with current pain medications. Patient reports normal lochia. She is currently tolerating general diet without nausea or vomiting. She reports flatus yes. Vitals:  Visit Vitals    BP 99/55    Pulse 60    Temp 97.9 °F (36.6 °C)    Resp 16    Ht 5' 6\" (1.676 m)    Wt 87.1 kg (192 lb)    SpO2 100%    Breastfeeding Unknown    BMI 30.99 kg/m2     Temp (24hrs), Av.3 °F (36.8 °C), Min:97.9 °F (36.6 °C), Max:98.5 °F (36.9 °C)      Last 24hr Input/Output:    Intake/Output Summary (Last 24 hours) at 18 0743  Last data filed at 08/15/18 0748   Gross per 24 hour   Intake                0 ml   Output              725 ml   Net             -725 ml          Exam:   Gen: Patient without distress. CV: RRR  Chest: CTA  Abdomen:soft, expected mild tenderness, fundus firm @U-2, incision closed with suture and Dermabond  Lower extremities are no tenderness with absent   edema. Labs:   Lab Results   Component Value Date/Time    WBC 12.0 (H) 08/15/2018 03:32 AM    WBC 8.8 2018 11:12 AM    HGB 10.8 (L) 08/15/2018 03:32 AM    HGB 11.9 2018 11:12 AM    HCT 31.7 (L) 08/15/2018 03:32 AM    HCT 36.6 2018 11:12 AM    PLATELET 582  03:32 AM    PLATELET 359  11:12 AM    Hgb, External 12.8 2018    Hct, External 39.8 2018    Platelet cnt., External 293 2018       No results found for this or any previous visit (from the past 24 hour(s)).   Lab Results   Component Value Date/Time    Rubella, External immune 2018    GrBStrep, External negative 2018    HBsAg, External negative 2018    HIV, External negative 2018    Gonorrhea, External negative 2018    Chlamydia, External negative 2018         Information for the patient's :  Griselda Lawn, Male [[id number]]   One Minute Apgar: 5 (Filed from Delivery Summary)  Five  Minute Apgar: 9 (Filed from Delivery Summary)      Assessment:   POD 2 s/p RLTCS for elective repeat. Doing well and stable  Plan:  1. Continue routine post operative care. 2.  Advance diet as tolerated, ambulate, incentive spirometry  3. Medical conditions: none  4. Discharge home today   5.  Baby boy: desires circ, consent reviewed, will perform this NETO Lott DO  2018  7:43 AM

## 2018-08-16 NOTE — DISCHARGE INSTRUCTIONS
Discharge Instructions for  Section    Patient ID:  Jasmin Heller  [id number]  26 y.o.  1985    Take Home Medications         Continue taking your prenatal vitamins if you are breastfeeding. Follow-up Appointment:  Follow-up with [facility] in 2 weeks. Follow-up care is a key part of your treatment and safety. Be sure to make and go to all appointments, and call your doctor if you are having problems. Its also a good idea to know your test results and keep a list of the medicines you take. Activity  Avoid lifting more than 10 lbs for 6 weeks after your delivery. Don't put anything in your vagina for 6 weeks (no intercourse, tampons, or douching). Limit climbing stairs to twice a day, and please hold a railing. Have someone else carry your baby up stairs initially, as you may be a bit unsteady. No driving for about 2 weeks or until your are easily able to turn your body and move your foot from the gas to the brake pedal without pain- you need to be able to move quickly enough to respond to traffic. You cannot drive while you are taking narcotics (prescription pain medications). You may shower but do not take a bath for 2 weeks. Be sure to dry your incision well after your shower. You may gradually work up to light exercise such as brisk walking over the next few weeks, but take it easy- you'll be tired and sore! You need to wait 4-6 weeks before starting vigorous exercise such as aerobics, running, weight-lifting, sit-ups, etc.- clear this with your doctor at your postpartum check-up. Although it's important to limit certain activities and avoid \"over-doing it\", walking is good for you and will actually speed the healing process. Walking increases the blood flow to your legs, decreasing the risk of blood clots, and also encourages the bowels to speed up, decreasing constipation, bloating, and gassiness. Don't stay in bed at home, get up and move around the house.   You'll feel better more quickly. Diet  You may eat a regular diet but you may want to avoid heavy, greasy or spicy foods and other foods that could increase constipation and gas. Wound care  Your incision may have been closed with conventional (metal) staples, absorbable (dissolving) staples, or absorbable sutures. If you still have staples in your incision when you leave the hospital, call your doctor's office as instructed to schedule an appointment to have them removed (usually in about a week). Otherwise, there may be some small tapes over your incision called Steri-strips. Please remove these in about a week. There also may be Dermabond glue over your incision which looks like a clear, shiny coating (as if it were painted with clear fingernail polish). This will gradually peel off over the following weeks, do not remove it. It is normal to have a small amount of clear or reddish drainage from your incision. It's best to leave it open to the air, but if there is drainage, you may cover the incision lightly with gauze, preferably without tape. Keep the incision as dry as possible. You may even consider drying the incision with a cool hair dryer after you shower. Numbness of the skin at or around your incision is normal and the feeling usually returns gradually. Call your doctor if you have a lot of drainage from your incision, an unpleasant odor, red streaks, an increase in pain, or the incision appears to open up. Pain Management  You were probably given a prescription for pain medication, similar to the one you've been taking while in the hospital.  In addition to this, you can take over-the-counter pain medicines like Advil or Motrin (ibuprofen). You can take both medications together, alternating doses every few hours. You will get the most relief if you take the maximum dose:  Motrin or Advil (generic ibuprofen) 800 mg every 8 hours (4 tablets or capsules every 8 hours).   The prescription you were given probably contains a narcotic mixed with Tylenol, therefore, you should not take any extra Tylenol or acetominophen, or you could be getting more than the safe amount. If you feel you don't need the prescription pain medication, you may take Tylenol instead, along with ibuprofen. The maximum dose is Tylenol or acetominophen 1000 mg every 6 hours (equivalent to 2 Extra Strength Tylenols every 6 hours). After a few days, you should reduce the amount of prescription pain medication you are taking. At that point, try to use ibuprofen as the main medication, and take the prescription medication if needed for more severe pain not relieved by the ibuprofen. Your goal should be to take only the minimum necessary amounts of the prescription medication (narcotic), as these pain medicines can be addictive and will worsen or cause constipation. Most patients will find that within a few days, their pain is adequately controlled using only over-the-counter medications and minimal doses of prescription pain medicine. A heating pad can also be very helpful for cramping or back pain. Sitz baths are helpful for pain associated with hemorrhoids. You can use either a sitz bath basin or a bathtub filled with 2-3\" inches of plain warm water. Soak for 10 minutes 3 times a day. Over-the-counter hemorrhoid wipes and ointments are fine to use as well. Constipation  You may find that bowel movements are irregular after delivery and that you have a tendency to be constipated, especially after surgery. A stool softener such as Colace (docusate) can safely be taken daily if needed. If you become constipated you can use a laxative such as Dulcolax, Miralax or Milk of Magnesia. Don't wait until constipation is severe- take something sooner rather than later and you will feel much better! Suppositories such as Dulcolax or Fleet's Enemas are options too.     Your Recovery: What to Expect at Home  Delivering a baby is hard work and you probably aren't getting much sleep, so you will certainly be tired, especially after having a  section. Try to rest when you can and don't worry about doing housework or other tasks which can wait. The soreness in your incision will improve significantly over the first 2 weeks or so, but it may take 6-8 weeks before you are completely recovered. You are likely to have some back pain or general body aches or muscle soreness. This should improve with acetominophen or ibuprofen. If your legs were swollen during your pregnancy or as a result of IV fluids given during your hospital stay, this should go away in a few days to a week. Most women experience some form of the \"Baby Blues\" after having a baby. This means that you may feel emotional, tearful, frustrated, anxious, sad, and irritable some of the time. This is normal if it's not severe and should go away after about 2 weeks. Getting as much rest as you can will help. Accept assistance from friends and family members so that you can take breaks from caring for the baby. Call your doctor if your symptoms seem severe, last more than 2 weeks, or seem to be getting worse instead of better. Get help immediately if you have thoughts of wanting to hurt yourself or others! When should you call for help? Call 911 anytime you think you may need emergency care. For example, call if:  You pass out (lose consciousness). You have sudden chest pain and shortness of breath, or you cough up blood. You have severe pain in your belly. Call your doctor now or seek immediate medical care if:  You have heavy vaginal bleeding that soaks one or more pads in an hour, or you have large clots (golf ball size or larger). Your have foul-smelling discharge from your vagina or incision. You are sick to your stomach or cannot keep fluids down. You have pain that does not get better after you take pain medicine. You have signs of infection, such as:   Increased pain in your abdomen or vaginal area. Red streaks, warmth, or tenderness of your breasts or the area around your incision. A fever of 101 or greater (taken by mouth). You have signs of a blood clot, such as:  Pain in your calf, back of knee, thigh, or groin. Redness and swelling in your leg or groin. You have trouble passing urine or stool, especially if you have pain or swelling in your lower belly; or if you are unable to have a bowel movement after taking a laxative. You have a fast or pounding heartbeat.

## 2018-08-16 NOTE — ROUTINE PROCESS
0012  SBAR received at bedside and chart open in computer. Plan of care and discharge process discussed with patient and FOB. No needs at this time. 0750  AM assessment performed on patient at this time. Discharge instructions given to patient including but not limited to signs and symptoms and who to call; restrictions and limitations; postpartum depression. All questions answered. 0810  Pain medication given to patient. Discharge supplies given at this time. No further needs expressed. 0910  Pain reassessed at this time. No further needs at this time. 1010  Puroposeful rounding done on patient at this time. Nipple cream given to patient. 1120  Discharge paperwork signed in computer. 2 prescriptions given to patient.

## 2022-03-19 PROBLEM — O34.219 PREVIOUS CESAREAN DELIVERY AFFECTING PREGNANCY: Status: ACTIVE | Noted: 2018-08-14

## 2024-05-21 ENCOUNTER — HOSPITAL ENCOUNTER (OUTPATIENT)
Facility: HOSPITAL | Age: 39
Discharge: HOME | End: 2024-05-24
Payer: COMMERCIAL

## 2024-05-21 DIAGNOSIS — R10.9 ABDOMINAL PAIN, UNSPECIFIED ABDOMINAL LOCATION: ICD-10-CM

## 2024-05-21 PROCEDURE — 76700 US EXAM ABDOM COMPLETE: CPT

## (undated) DEVICE — STERILE POLYISOPRENE POWDER-FREE SURGICAL GLOVES: Brand: PROTEXIS

## (undated) DEVICE — REM POLYHESIVE ADULT PATIENT RETURN ELECTRODE: Brand: VALLEYLAB

## (undated) DEVICE — ABDOMINAL PAD: Brand: DERMACEA

## (undated) DEVICE — GOWN,AURORA,FABRIC-REINFORCED,X-LARGE: Brand: MEDLINE

## (undated) DEVICE — 3000CC GUARDIAN II: Brand: GUARDIAN

## (undated) DEVICE — TOWEL,OR,DSP,ST,BLUE,STD,2/PK,40PK/CS: Brand: MEDLINE

## (undated) DEVICE — 3M™ MEDIPORE™ H SOFT CLOTH SURGICAL TAPE, 2863, 3 IN X 10 YD, 12/CASE: Brand: 3M™ MEDIPORE™

## (undated) DEVICE — SUTURE STRATAFIX SYMMETRIC PDS + SZ 1 L18IN ABSRB VLT L48MM SXPP1A400

## (undated) DEVICE — SUTURE 0 L36IN ABSRB BRN CT L40MM 1/2 CIR TAPERPOINT SGL 914H

## (undated) DEVICE — Z DUP USE 2227076 BARRIER ADH TISS 4-SECTION SEPRAFILM

## (undated) DEVICE — ROCKER SWITCH PENCIL HOLSTER: Brand: VALLEYLAB

## (undated) DEVICE — TRAY CATH OD16FR SIL URIN M STATLOK STBL DEV SURSTP

## (undated) DEVICE — TIP CLEANER: Brand: VALLEYLAB

## (undated) DEVICE — BLADE ASSEMB CLP HAIR FINE --

## (undated) DEVICE — KENDALL SCD EXPRESS SLEEVES, KNEE LENGTH, MEDIUM: Brand: KENDALL SCD

## (undated) DEVICE — HANDLE LT SNAP ON ULT DURABLE LENS FOR TRUMPF ALC DISPOSABLE

## (undated) DEVICE — C-SECTION II-LF: Brand: MEDLINE INDUSTRIES, INC.

## (undated) DEVICE — (D)PREP SKN CHLRAPRP APPL 26ML -- CONVERT TO ITEM 371833

## (undated) DEVICE — SUT VCRL + 3 27IN KS UND --

## (undated) DEVICE — SUTURE VCRL SZ 0 L36IN ABSRB VLT L40MM CT 1/2 CIR J358H